# Patient Record
Sex: FEMALE | Race: WHITE | NOT HISPANIC OR LATINO
[De-identification: names, ages, dates, MRNs, and addresses within clinical notes are randomized per-mention and may not be internally consistent; named-entity substitution may affect disease eponyms.]

---

## 2021-05-20 ENCOUNTER — NON-APPOINTMENT (OUTPATIENT)
Age: 76
End: 2021-05-20

## 2021-05-25 ENCOUNTER — APPOINTMENT (OUTPATIENT)
Dept: ORTHOPEDIC SURGERY | Facility: CLINIC | Age: 76
End: 2021-05-25
Payer: MEDICARE

## 2021-05-25 ENCOUNTER — NON-APPOINTMENT (OUTPATIENT)
Age: 76
End: 2021-05-25

## 2021-05-25 VITALS
WEIGHT: 120 LBS | BODY MASS INDEX: 19.29 KG/M2 | HEART RATE: 108 BPM | SYSTOLIC BLOOD PRESSURE: 158 MMHG | HEIGHT: 66 IN | DIASTOLIC BLOOD PRESSURE: 84 MMHG

## 2021-05-25 DIAGNOSIS — M19.049 PRIMARY OSTEOARTHRITIS, UNSPECIFIED HAND: ICD-10-CM

## 2021-05-25 PROCEDURE — 73130 X-RAY EXAM OF HAND: CPT | Mod: 50

## 2021-05-25 PROCEDURE — 99204 OFFICE O/P NEW MOD 45 MIN: CPT

## 2021-05-25 NOTE — ASSESSMENT
[FreeTextEntry1] : a 76-year-old female with severe bilateral hand arthritis and deformity likely secondary to rheumatoid arthritis.Recommend initial workup by rheumatology, she will continue activity as tolerated and followup after her rheumatologic workup.

## 2021-05-25 NOTE — PHYSICAL EXAM
[Normal Finger/nose] : finger to nose coordination [Normal] : no peripheral adenopathy appreciated [de-identified] : bilateral hands: \par skin intact swelling over the dorsal aspect of the left wrist as well as multiple MCP joints, no ecchymosis no erythema\par ulnar drift of all digits with subluxation at the MCP joints\par active extension of the MCP joints is limited secondary to tendon subluxation\par wrist range of motion limited secondary to stiffness\par neurovascular intact distally [de-identified] : delroyr [de-identified] : 3 x-ray views of bilateral hands are reviewed there is advanced arthritis involving all MCP joints including subluxation of the joints, there is advanced STT arthritis bilaterally no acute fracture

## 2021-05-25 NOTE — HISTORY OF PRESENT ILLNESS
[FreeTextEntry1] : Ms. KOCH is a 76 year old female presenting for evaluation of bilateral hand pain. She notes her pain has been present since 2/12/20. over the past year she has had progressive pain and deformity involving bilateral wrists as well as all digits.  she is limited in activities of daily living secondary to the deformities.  She has not seen a rheumatologist in the past.

## 2021-10-27 ENCOUNTER — APPOINTMENT (OUTPATIENT)
Dept: CARDIOLOGY | Facility: CLINIC | Age: 76
End: 2021-10-27
Payer: MEDICARE

## 2021-10-27 PROCEDURE — 93306 TTE W/DOPPLER COMPLETE: CPT

## 2021-10-28 ENCOUNTER — APPOINTMENT (OUTPATIENT)
Dept: FAMILY MEDICINE | Facility: CLINIC | Age: 76
End: 2021-10-28
Payer: MEDICARE

## 2021-10-28 VITALS
SYSTOLIC BLOOD PRESSURE: 144 MMHG | TEMPERATURE: 96.8 F | OXYGEN SATURATION: 96 % | HEART RATE: 119 BPM | DIASTOLIC BLOOD PRESSURE: 80 MMHG | WEIGHT: 104 LBS | HEIGHT: 66 IN | BODY MASS INDEX: 16.71 KG/M2

## 2021-10-28 DIAGNOSIS — Z83.3 FAMILY HISTORY OF DIABETES MELLITUS: ICD-10-CM

## 2021-10-28 DIAGNOSIS — Z12.11 ENCOUNTER FOR SCREENING FOR MALIGNANT NEOPLASM OF COLON: ICD-10-CM

## 2021-10-28 PROCEDURE — 99214 OFFICE O/P EST MOD 30 MIN: CPT | Mod: 25

## 2021-10-28 PROCEDURE — G0438: CPT

## 2021-10-28 RX ORDER — FOLIC ACID 1 MG/1
1 TABLET ORAL
Refills: 0 | Status: ACTIVE | COMMUNITY

## 2021-10-28 RX ORDER — MELOXICAM 15 MG/1
15 TABLET ORAL
Qty: 14 | Refills: 1 | Status: DISCONTINUED | COMMUNITY
Start: 2021-05-25 | End: 2021-10-28

## 2021-10-28 NOTE — HISTORY OF PRESENT ILLNESS
[de-identified] : Pt presenting for CPE\par \par Recently developed RA- Sees [facility] - on methotrexate and folic acid x <2 months. \par Had Lung CT done recently- found lung nodule\par scheduled for CT IV contrast on 11/3/21 scheduled \par Prior smoker, has been smoking for 20 years \par quit after she found the nbodule\par no sob or weight loss or hemoptysis \par \par Feels very anxious and depressed - needs helping sleeping

## 2021-10-28 NOTE — HEALTH RISK ASSESSMENT
[Good] : ~his/her~  mood as  good [No] : In the past 12 months have you used drugs other than those required for medical reasons? No [0] : 2) Feeling down, depressed, or hopeless: Not at all (0) [I have developed a follow-up plan documented below in the note.] : I have developed a follow-up plan documented below in the note. [None] : None [Alone] : lives alone [] :  [Feels Safe at Home] : Feels safe at home [Fully functional (bathing, dressing, toileting, transferring, walking, feeding)] : Fully functional (bathing, dressing, toileting, transferring, walking, feeding) [Fully functional (using the telephone, shopping, preparing meals, housekeeping, doing laundry, using] : Fully functional and needs no help or supervision to perform IADLs (using the telephone, shopping, preparing meals, housekeeping, doing laundry, using transportation, managing medications and managing finances) [With Patient/Caregiver] : , with patient/caregiver [Reviewed no changes] : Reviewed, no changes [] : No [YearQuit] : 2021 [Change in mental status noted] : No change in mental status noted [Sexually Active] : not sexually active [High Risk Behavior] : no high risk behavior [Reports changes in hearing] : Reports no changes in hearing [Reports changes in vision] : Reports no changes in vision [Reports changes in dental health] : Reports no changes in dental health [AdvancecareDate] : 10/28/21

## 2021-10-28 NOTE — REVIEW OF SYSTEMS
[Joint Pain] : joint pain [Muscle Pain] : muscle pain [Back Pain] : back pain [Insomnia] : insomnia [Anxiety] : anxiety [Depression] : depression [Negative] : Genitourinary

## 2021-10-28 NOTE — ASSESSMENT
[FreeTextEntry1] : Pt presenting for annual physical. \par \par Reviewed diet, exercise (150 min/moderate intensity exercise weekly), lifestyle modifications. \par Discussed STD prevention and discussed screening studies per below:\par \par Breast Cancer- Mammogram\par Colon Cancer- GI referral \par \par Labs done 6 weeks ago at rheum office, will obtain results \par f.u in 1 year or earlier if needed \par \par \par starterd on lexapro - f.u in 4-6 weeks\par sleep disturbance- sent ambien, f..u in 1 month for refills \par

## 2021-11-15 ENCOUNTER — APPOINTMENT (OUTPATIENT)
Dept: PULMONOLOGY | Facility: CLINIC | Age: 76
End: 2021-11-15
Payer: MEDICARE

## 2021-11-15 VITALS — HEIGHT: 66 IN | WEIGHT: 115 LBS | RESPIRATION RATE: 15 BRPM | BODY MASS INDEX: 18.48 KG/M2

## 2021-11-15 VITALS — DIASTOLIC BLOOD PRESSURE: 80 MMHG | SYSTOLIC BLOOD PRESSURE: 130 MMHG

## 2021-11-15 VITALS — OXYGEN SATURATION: 98 % | HEART RATE: 110 BPM

## 2021-11-15 DIAGNOSIS — Z87.891 PERSONAL HISTORY OF NICOTINE DEPENDENCE: ICD-10-CM

## 2021-11-15 PROCEDURE — 99204 OFFICE O/P NEW MOD 45 MIN: CPT

## 2021-11-15 RX ORDER — CHOLECALCIFEROL (VITAMIN D3) 1250 MCG
1.25 MG CAPSULE ORAL
Qty: 12 | Refills: 0 | Status: ACTIVE | COMMUNITY
Start: 2021-08-19

## 2021-11-15 RX ORDER — HYDROXYCHLOROQUINE SULFATE 200 MG/1
200 TABLET, FILM COATED ORAL
Qty: 90 | Refills: 0 | Status: ACTIVE | COMMUNITY
Start: 2021-09-30

## 2021-12-02 ENCOUNTER — APPOINTMENT (OUTPATIENT)
Dept: FAMILY MEDICINE | Facility: CLINIC | Age: 76
End: 2021-12-02
Payer: MEDICARE

## 2021-12-02 VITALS
SYSTOLIC BLOOD PRESSURE: 130 MMHG | TEMPERATURE: 97 F | BODY MASS INDEX: 18.48 KG/M2 | OXYGEN SATURATION: 96 % | DIASTOLIC BLOOD PRESSURE: 72 MMHG | WEIGHT: 115 LBS | HEIGHT: 66 IN | HEART RATE: 106 BPM

## 2021-12-02 DIAGNOSIS — N39.0 URINARY TRACT INFECTION, SITE NOT SPECIFIED: ICD-10-CM

## 2021-12-02 PROCEDURE — 99213 OFFICE O/P EST LOW 20 MIN: CPT

## 2021-12-02 NOTE — HISTORY OF PRESENT ILLNESS
[FreeTextEntry8] : Pt presenting for xanax refill. \par doing well\par stopped ambien and lexapro\par had AE with both and did not want to continue at this time. \par

## 2022-02-14 ENCOUNTER — APPOINTMENT (OUTPATIENT)
Dept: PULMONOLOGY | Facility: CLINIC | Age: 77
End: 2022-02-14

## 2022-03-22 ENCOUNTER — APPOINTMENT (OUTPATIENT)
Dept: PULMONOLOGY | Facility: CLINIC | Age: 77
End: 2022-03-22
Payer: MEDICARE

## 2022-03-22 VITALS — OXYGEN SATURATION: 96 % | SYSTOLIC BLOOD PRESSURE: 136 MMHG | HEART RATE: 103 BPM | DIASTOLIC BLOOD PRESSURE: 74 MMHG

## 2022-03-22 VITALS — WEIGHT: 106 LBS | BODY MASS INDEX: 20.01 KG/M2 | HEIGHT: 61 IN

## 2022-03-22 DIAGNOSIS — F17.200 NICOTINE DEPENDENCE, UNSPECIFIED, UNCOMPLICATED: ICD-10-CM

## 2022-03-22 DIAGNOSIS — R91.1 SOLITARY PULMONARY NODULE: ICD-10-CM

## 2022-03-22 DIAGNOSIS — Z87.891 PERSONAL HISTORY OF NICOTINE DEPENDENCE: ICD-10-CM

## 2022-03-22 PROCEDURE — 94010 BREATHING CAPACITY TEST: CPT

## 2022-03-22 PROCEDURE — 94727 GAS DIL/WSHOT DETER LNG VOL: CPT

## 2022-03-22 PROCEDURE — 99406 BEHAV CHNG SMOKING 3-10 MIN: CPT

## 2022-03-22 PROCEDURE — 94729 DIFFUSING CAPACITY: CPT

## 2022-03-22 PROCEDURE — 85018 HEMOGLOBIN: CPT | Mod: QW

## 2022-03-22 PROCEDURE — 99214 OFFICE O/P EST MOD 30 MIN: CPT | Mod: 25

## 2022-05-19 DIAGNOSIS — H91.90 UNSPECIFIED HEARING LOSS, UNSPECIFIED EAR: ICD-10-CM

## 2022-05-25 ENCOUNTER — NON-APPOINTMENT (OUTPATIENT)
Age: 77
End: 2022-05-25

## 2022-06-14 ENCOUNTER — APPOINTMENT (OUTPATIENT)
Dept: FAMILY MEDICINE | Facility: CLINIC | Age: 77
End: 2022-06-14
Payer: MEDICARE

## 2022-06-14 VITALS
WEIGHT: 101 LBS | BODY MASS INDEX: 19.07 KG/M2 | HEIGHT: 61 IN | SYSTOLIC BLOOD PRESSURE: 145 MMHG | HEART RATE: 100 BPM | OXYGEN SATURATION: 98 % | DIASTOLIC BLOOD PRESSURE: 75 MMHG | TEMPERATURE: 97.1 F

## 2022-06-14 PROCEDURE — 99213 OFFICE O/P EST LOW 20 MIN: CPT

## 2022-06-14 NOTE — HISTORY OF PRESENT ILLNESS
[FreeTextEntry8] : PT diagnosed was osteoporosis takes tymlos daily - sees rheum July 7th \par Sees pulm- had PFT done \par Takes xanax prn, needs refill \par doing well\par

## 2022-06-14 NOTE — PHYSICAL EXAM
[Normal] : no acute distress, well nourished, well developed and well-appearing [de-identified] : anxious

## 2022-07-26 ENCOUNTER — APPOINTMENT (OUTPATIENT)
Dept: PULMONOLOGY | Facility: CLINIC | Age: 77
End: 2022-07-26

## 2022-11-03 ENCOUNTER — OFFICE (OUTPATIENT)
Dept: URBAN - METROPOLITAN AREA CLINIC 12 | Facility: CLINIC | Age: 77
Setting detail: OPHTHALMOLOGY
End: 2022-11-03

## 2022-11-03 DIAGNOSIS — H90.3: ICD-10-CM

## 2022-11-03 PROCEDURE — 92593 HEARING AID CHECK; BINAURAL: CPT

## 2023-02-02 ENCOUNTER — OFFICE (OUTPATIENT)
Dept: URBAN - METROPOLITAN AREA CLINIC 12 | Facility: CLINIC | Age: 78
Setting detail: OPHTHALMOLOGY
End: 2023-02-02

## 2023-02-02 DIAGNOSIS — H90.3: ICD-10-CM

## 2023-02-02 PROCEDURE — 92593 HEARING AID CHECK; BINAURAL: CPT

## 2023-05-11 ENCOUNTER — OFFICE (OUTPATIENT)
Dept: URBAN - METROPOLITAN AREA CLINIC 12 | Facility: CLINIC | Age: 78
Setting detail: OPHTHALMOLOGY
End: 2023-05-11

## 2023-05-11 DIAGNOSIS — H90.3: ICD-10-CM

## 2023-05-11 PROCEDURE — 92593 HEARING AID CHECK; BINAURAL: CPT

## 2023-05-15 ENCOUNTER — NON-APPOINTMENT (OUTPATIENT)
Age: 78
End: 2023-05-15

## 2023-05-17 ENCOUNTER — APPOINTMENT (OUTPATIENT)
Dept: FAMILY MEDICINE | Facility: CLINIC | Age: 78
End: 2023-05-17
Payer: MEDICARE

## 2023-05-17 DIAGNOSIS — G47.9 SLEEP DISORDER, UNSPECIFIED: ICD-10-CM

## 2023-05-17 PROCEDURE — 99441: CPT | Mod: 95

## 2023-07-25 ENCOUNTER — NON-APPOINTMENT (OUTPATIENT)
Age: 78
End: 2023-07-25

## 2023-07-26 ENCOUNTER — APPOINTMENT (OUTPATIENT)
Dept: FAMILY MEDICINE | Facility: CLINIC | Age: 78
End: 2023-07-26
Payer: MEDICARE

## 2023-07-26 VITALS
WEIGHT: 103 LBS | HEIGHT: 61 IN | DIASTOLIC BLOOD PRESSURE: 84 MMHG | HEART RATE: 107 BPM | BODY MASS INDEX: 19.45 KG/M2 | SYSTOLIC BLOOD PRESSURE: 146 MMHG | OXYGEN SATURATION: 97 % | TEMPERATURE: 97.4 F

## 2023-07-26 DIAGNOSIS — Z12.39 ENCOUNTER FOR OTHER SCREENING FOR MALIGNANT NEOPLASM OF BREAST: ICD-10-CM

## 2023-07-26 DIAGNOSIS — R03.0 ELEVATED BLOOD-PRESSURE READING, W/OUT DIAGNOSIS OF HYPERTENSION: ICD-10-CM

## 2023-07-26 PROCEDURE — G0439: CPT

## 2023-07-26 RX ORDER — ABALOPARATIDE 2000 UG/ML
INJECTION, SOLUTION SUBCUTANEOUS
Refills: 0 | Status: DISCONTINUED | COMMUNITY
End: 2023-07-26

## 2023-07-26 NOTE — REVIEW OF SYSTEMS
[Joint Pain] : joint pain [Muscle Pain] : muscle pain [Itching] : Itching [Skin Rash] : skin rash [Negative] : Respiratory

## 2023-07-26 NOTE — ASSESSMENT
[FreeTextEntry1] : Pt presenting for annual physical. \par \par Reviewed diet, exercise (150 min/moderate intensity exercise weekly), lifestyle modifications. \par Discussed STD prevention and discussed screening studies per below:\par \par Breast Cancer- Mammogram\par \par \par \par HTN- start lisinopril 5 mg \par cont to monitor f/u in 1 month for BP check\par will bring in labs from rheum at that time, if needed will order more\par

## 2023-07-26 NOTE — HISTORY OF PRESENT ILLNESS
[de-identified] : Pt presenting for CPE \par \par Currently on prednisone for rheum flare, melanie been on it for 3 weeks. \par Severe Osteoporosis, was on timylos, now on antivia (monthly) \par \par BP elevated, lots of stress, anxious\par wants to see therapist \par will refer to [facility]

## 2023-08-21 ENCOUNTER — OFFICE (OUTPATIENT)
Dept: URBAN - METROPOLITAN AREA CLINIC 12 | Facility: CLINIC | Age: 78
Setting detail: OPHTHALMOLOGY
End: 2023-08-21

## 2023-08-21 ENCOUNTER — RX CHANGE (OUTPATIENT)
Age: 78
End: 2023-08-21

## 2023-08-21 DIAGNOSIS — H90.3: ICD-10-CM

## 2023-08-21 RX ORDER — LISINOPRIL 5 MG/1
5 TABLET ORAL
Qty: 30 | Refills: 1 | Status: DISCONTINUED | COMMUNITY
Start: 2023-07-26 | End: 2023-08-21

## 2023-08-29 ENCOUNTER — APPOINTMENT (OUTPATIENT)
Dept: FAMILY MEDICINE | Facility: CLINIC | Age: 78
End: 2023-08-29
Payer: MEDICARE

## 2023-08-29 VITALS
DIASTOLIC BLOOD PRESSURE: 74 MMHG | HEART RATE: 98 BPM | HEIGHT: 61 IN | BODY MASS INDEX: 19.45 KG/M2 | OXYGEN SATURATION: 98 % | SYSTOLIC BLOOD PRESSURE: 136 MMHG | WEIGHT: 103 LBS

## 2023-08-29 PROCEDURE — 99214 OFFICE O/P EST MOD 30 MIN: CPT

## 2023-08-29 NOTE — HISTORY OF PRESENT ILLNESS
[de-identified] : Pt presenting for HTN followup compliant with meds doing well BP controlled no AE of meds   Needd xanax refill  
Breath sounds clear and equal bilaterally.

## 2023-09-06 ENCOUNTER — OFFICE (OUTPATIENT)
Dept: URBAN - METROPOLITAN AREA CLINIC 12 | Facility: CLINIC | Age: 78
Setting detail: OPHTHALMOLOGY
End: 2023-09-06
Payer: MEDICARE

## 2023-09-06 DIAGNOSIS — H90.3: ICD-10-CM

## 2023-09-06 PROCEDURE — 92567 TYMPANOMETRY: CPT

## 2023-09-06 PROCEDURE — 92557 COMPREHENSIVE HEARING TEST: CPT

## 2023-11-17 ENCOUNTER — NON-APPOINTMENT (OUTPATIENT)
Age: 78
End: 2023-11-17

## 2023-12-06 ENCOUNTER — OFFICE (OUTPATIENT)
Dept: URBAN - METROPOLITAN AREA CLINIC 12 | Facility: CLINIC | Age: 78
Setting detail: OPHTHALMOLOGY
End: 2023-12-06

## 2023-12-06 DIAGNOSIS — H90.3: ICD-10-CM

## 2023-12-06 PROCEDURE — 92593 HEARING AID CHECK; BINAURAL: CPT

## 2024-02-22 ENCOUNTER — APPOINTMENT (OUTPATIENT)
Dept: FAMILY MEDICINE | Facility: CLINIC | Age: 79
End: 2024-02-22

## 2024-02-27 ENCOUNTER — APPOINTMENT (OUTPATIENT)
Dept: FAMILY MEDICINE | Facility: CLINIC | Age: 79
End: 2024-02-27
Payer: MEDICARE

## 2024-02-27 VITALS
OXYGEN SATURATION: 94 % | DIASTOLIC BLOOD PRESSURE: 66 MMHG | HEIGHT: 61 IN | TEMPERATURE: 97.3 F | WEIGHT: 97 LBS | BODY MASS INDEX: 18.31 KG/M2 | HEART RATE: 106 BPM | SYSTOLIC BLOOD PRESSURE: 100 MMHG

## 2024-02-27 DIAGNOSIS — M06.9 RHEUMATOID ARTHRITIS, UNSPECIFIED: ICD-10-CM

## 2024-02-27 DIAGNOSIS — F41.9 ANXIETY DISORDER, UNSPECIFIED: ICD-10-CM

## 2024-02-27 DIAGNOSIS — Z00.00 ENCOUNTER FOR GENERAL ADULT MEDICAL EXAMINATION W/OUT ABNORMAL FINDINGS: ICD-10-CM

## 2024-02-27 DIAGNOSIS — I10 ESSENTIAL (PRIMARY) HYPERTENSION: ICD-10-CM

## 2024-02-27 DIAGNOSIS — J43.9 EMPHYSEMA, UNSPECIFIED: ICD-10-CM

## 2024-02-27 DIAGNOSIS — J44.9 CHRONIC OBSTRUCTIVE PULMONARY DISEASE, UNSPECIFIED: ICD-10-CM

## 2024-02-27 DIAGNOSIS — F32.A ANXIETY DISORDER, UNSPECIFIED: ICD-10-CM

## 2024-02-27 PROCEDURE — G0439: CPT

## 2024-02-27 PROCEDURE — 99213 OFFICE O/P EST LOW 20 MIN: CPT | Mod: 25

## 2024-02-27 RX ORDER — METHOTREXATE 2.5 MG/1
TABLET ORAL
Refills: 0 | Status: DISCONTINUED | COMMUNITY
End: 2024-02-27

## 2024-02-27 NOTE — ASSESSMENT
[FreeTextEntry1] : Discussed preventative care with patient  RA_ follows with rheum, gets infusions for RA  advised that patient is fall risk, needs rolling walker and cane to help with fall prevention also needs home PT for strengthening

## 2024-02-27 NOTE — HISTORY OF PRESENT ILLNESS
[de-identified] : PT presenting for CPE   Had PET scan in Nov 2023- pulm did biopsy- inflammation 2/2 RA RA flare- gets Simponi aria infusions Q2 months at [facility]- gets Eveity - monthly, lasty tx is March 8th, then to recheck DEXA

## 2024-02-27 NOTE — PHYSICAL EXAM
[Normal] : no acute distress, well nourished, well developed and well-appearing [de-identified] : worsening joint pain, RA nodules

## 2024-02-27 NOTE — HEALTH RISK ASSESSMENT
[Fair] :  ~his/her~ mood as fair [No] : No [One fall no injury in past year] : Patient reported one fall in the past year without injury [0] : 1) Little interest or pleasure doing things: Not at all (0) [1] : 2) Feeling down, depressed, or hopeless for several days (1) [PHQ-2 Positive] : PHQ-2 Positive [Patient declined mammogram] : Patient declined mammogram [Patient declined PAP Smear] : Patient declined PAP Smear [Patient reported bone density results were abnormal] : Patient reported bone density results were abnormal [Patient declined colonoscopy] : Patient declined colonoscopy [Housing] : housing [Transportation] : transportation [Reports changes in hearing] : Reports changes in hearing [Reports changes in vision] : Reports changes in vision [Reports changes in dental health] : Reports changes in dental health [Current] : Current

## 2024-03-06 ENCOUNTER — OFFICE (OUTPATIENT)
Dept: URBAN - METROPOLITAN AREA CLINIC 12 | Facility: CLINIC | Age: 79
Setting detail: OPHTHALMOLOGY
End: 2024-03-06

## 2024-03-06 DIAGNOSIS — H90.3: ICD-10-CM

## 2024-03-06 PROCEDURE — 92593 HEARING AID CHECK; BINAURAL: CPT

## 2024-04-24 ENCOUNTER — NON-APPOINTMENT (OUTPATIENT)
Age: 79
End: 2024-04-24

## 2024-04-26 RX ORDER — ALPRAZOLAM 0.5 MG/1
0.5 TABLET ORAL
Qty: 30 | Refills: 0 | Status: ACTIVE | COMMUNITY
Start: 2021-11-16 | End: 1900-01-01

## 2024-05-14 RX ORDER — LISINOPRIL 5 MG/1
5 TABLET ORAL
Qty: 90 | Refills: 1 | Status: ACTIVE | COMMUNITY
Start: 1900-01-01 | End: 1900-01-01

## 2024-06-05 ENCOUNTER — OFFICE (OUTPATIENT)
Dept: URBAN - METROPOLITAN AREA CLINIC 12 | Facility: CLINIC | Age: 79
Setting detail: OPHTHALMOLOGY
End: 2024-06-05

## 2024-06-05 DIAGNOSIS — H90.3: ICD-10-CM

## 2024-06-05 PROCEDURE — 92593 HEARING AID CHECK; BINAURAL: CPT

## 2024-07-16 ENCOUNTER — NON-APPOINTMENT (OUTPATIENT)
Age: 79
End: 2024-07-16

## 2024-07-23 ENCOUNTER — APPOINTMENT (OUTPATIENT)
Dept: FAMILY MEDICINE | Facility: CLINIC | Age: 79
End: 2024-07-23
Payer: MEDICARE

## 2024-07-23 VITALS
WEIGHT: 102.05 LBS | DIASTOLIC BLOOD PRESSURE: 90 MMHG | HEART RATE: 102 BPM | SYSTOLIC BLOOD PRESSURE: 160 MMHG | OXYGEN SATURATION: 96 % | TEMPERATURE: 97.2 F | HEIGHT: 61 IN | BODY MASS INDEX: 19.27 KG/M2

## 2024-07-23 DIAGNOSIS — F41.9 ANXIETY DISORDER, UNSPECIFIED: ICD-10-CM

## 2024-07-23 DIAGNOSIS — I10 ESSENTIAL (PRIMARY) HYPERTENSION: ICD-10-CM

## 2024-07-23 DIAGNOSIS — F32.A ANXIETY DISORDER, UNSPECIFIED: ICD-10-CM

## 2024-07-23 DIAGNOSIS — M06.9 RHEUMATOID ARTHRITIS, UNSPECIFIED: ICD-10-CM

## 2024-07-23 PROCEDURE — 99214 OFFICE O/P EST MOD 30 MIN: CPT

## 2024-07-23 PROCEDURE — G2211 COMPLEX E/M VISIT ADD ON: CPT

## 2024-07-23 NOTE — ASSESSMENT
[FreeTextEntry1] : HTN well controlled typically, forgot meds today - denied cp/sob cont meds f/u in 6months   Xanax refilled   RA: is on new medication infusion discussed PCV 20 vaccine and Shingles vaccine course at the pharmacy

## 2024-07-23 NOTE — HISTORY OF PRESENT ILLNESS
[de-identified] : Pt presenting for HTN followup and xanax f/u  compliant with meds doing well BP controlled usually- today forgot to take her meds.  no AE of meds   Need xanax refill

## 2024-09-09 ENCOUNTER — OFFICE (OUTPATIENT)
Dept: URBAN - METROPOLITAN AREA CLINIC 12 | Facility: CLINIC | Age: 79
Setting detail: OPHTHALMOLOGY
End: 2024-09-09
Payer: MEDICARE

## 2024-09-09 DIAGNOSIS — H90.3: ICD-10-CM

## 2024-09-09 PROCEDURE — 92557 COMPREHENSIVE HEARING TEST: CPT | Mod: AB

## 2024-09-09 PROCEDURE — 92567 TYMPANOMETRY: CPT | Mod: AB

## 2024-10-17 ENCOUNTER — NON-APPOINTMENT (OUTPATIENT)
Age: 79
End: 2024-10-17

## 2024-10-18 ENCOUNTER — TRANSCRIPTION ENCOUNTER (OUTPATIENT)
Age: 79
End: 2024-10-18

## 2024-10-18 ENCOUNTER — APPOINTMENT (OUTPATIENT)
Dept: FAMILY MEDICINE | Facility: CLINIC | Age: 79
End: 2024-10-18
Payer: MEDICARE

## 2024-10-18 PROCEDURE — 99442: CPT | Mod: 93

## 2024-12-09 ENCOUNTER — OFFICE (OUTPATIENT)
Dept: URBAN - METROPOLITAN AREA CLINIC 12 | Facility: CLINIC | Age: 79
Setting detail: OPHTHALMOLOGY
End: 2024-12-09

## 2024-12-09 DIAGNOSIS — H90.3: ICD-10-CM

## 2024-12-09 PROCEDURE — 92593 HEARING AID CHECK; BINAURAL: CPT

## 2025-01-14 ENCOUNTER — OFFICE (OUTPATIENT)
Dept: URBAN - METROPOLITAN AREA CLINIC 12 | Facility: CLINIC | Age: 80
Setting detail: OPHTHALMOLOGY
End: 2025-01-14

## 2025-01-14 DIAGNOSIS — H90.3: ICD-10-CM

## 2025-01-14 PROCEDURE — 92593 HEARING AID CHECK; BINAURAL: CPT

## 2025-04-21 ENCOUNTER — TRANSCRIPTION ENCOUNTER (OUTPATIENT)
Age: 80
End: 2025-04-21

## 2025-05-12 ENCOUNTER — OFFICE (OUTPATIENT)
Dept: URBAN - METROPOLITAN AREA CLINIC 12 | Facility: CLINIC | Age: 80
Setting detail: OPHTHALMOLOGY
End: 2025-05-12

## 2025-05-12 DIAGNOSIS — H90.3: ICD-10-CM

## 2025-05-12 PROCEDURE — 92593 HEARING AID CHECK; BINAURAL: CPT

## 2025-06-02 ENCOUNTER — APPOINTMENT (OUTPATIENT)
Dept: FAMILY MEDICINE | Facility: CLINIC | Age: 80
End: 2025-06-02
Payer: MEDICARE

## 2025-06-02 VITALS
TEMPERATURE: 97 F | HEART RATE: 86 BPM | DIASTOLIC BLOOD PRESSURE: 72 MMHG | BODY MASS INDEX: 19.45 KG/M2 | WEIGHT: 103 LBS | SYSTOLIC BLOOD PRESSURE: 130 MMHG | OXYGEN SATURATION: 96 % | HEIGHT: 61 IN

## 2025-06-02 DIAGNOSIS — M81.0 AGE-RELATED OSTEOPOROSIS W/OUT CURRENT PATHOLOGICAL FRACTURE: ICD-10-CM

## 2025-06-02 DIAGNOSIS — F32.A ANXIETY DISORDER, UNSPECIFIED: ICD-10-CM

## 2025-06-02 DIAGNOSIS — F41.9 ANXIETY DISORDER, UNSPECIFIED: ICD-10-CM

## 2025-06-02 DIAGNOSIS — M06.9 RHEUMATOID ARTHRITIS, UNSPECIFIED: ICD-10-CM

## 2025-06-02 DIAGNOSIS — I10 ESSENTIAL (PRIMARY) HYPERTENSION: ICD-10-CM

## 2025-06-02 PROCEDURE — 99214 OFFICE O/P EST MOD 30 MIN: CPT

## 2025-06-02 RX ORDER — TOCILIZUMAB 180 MG/ML
INJECTION, SOLUTION SUBCUTANEOUS
Refills: 0 | Status: ACTIVE | COMMUNITY

## 2025-06-02 RX ORDER — ZOLEDRONIC ACID 5 MG/100ML
5 INJECTION, SOLUTION INTRAVENOUS
Refills: 0 | Status: ACTIVE | COMMUNITY

## 2025-08-12 ENCOUNTER — OFFICE (OUTPATIENT)
Dept: URBAN - METROPOLITAN AREA CLINIC 12 | Facility: CLINIC | Age: 80
Setting detail: OPHTHALMOLOGY
End: 2025-08-12

## 2025-08-12 DIAGNOSIS — H90.3: ICD-10-CM

## 2025-08-12 PROCEDURE — 92593 HEARING AID CHECK; BINAURAL: CPT
